# Patient Record
Sex: FEMALE | Race: OTHER | HISPANIC OR LATINO | Employment: UNEMPLOYED | URBAN - METROPOLITAN AREA
[De-identification: names, ages, dates, MRNs, and addresses within clinical notes are randomized per-mention and may not be internally consistent; named-entity substitution may affect disease eponyms.]

---

## 2019-12-25 ENCOUNTER — APPOINTMENT (EMERGENCY)
Dept: RADIOLOGY | Facility: HOSPITAL | Age: 64
End: 2019-12-25

## 2019-12-25 ENCOUNTER — HOSPITAL ENCOUNTER (EMERGENCY)
Facility: HOSPITAL | Age: 64
Discharge: HOME | End: 2019-12-25
Attending: EMERGENCY MEDICINE | Admitting: EMERGENCY MEDICINE

## 2019-12-25 VITALS
HEART RATE: 87 BPM | SYSTOLIC BLOOD PRESSURE: 161 MMHG | RESPIRATION RATE: 16 BRPM | DIASTOLIC BLOOD PRESSURE: 84 MMHG | TEMPERATURE: 98.6 F | WEIGHT: 147.71 LBS | OXYGEN SATURATION: 97 %

## 2019-12-25 DIAGNOSIS — S52.202A RADIUS/ULNA FRACTURE, LEFT, CLOSED, INITIAL ENCOUNTER: Primary | ICD-10-CM

## 2019-12-25 DIAGNOSIS — S52.92XA RADIUS/ULNA FRACTURE, LEFT, CLOSED, INITIAL ENCOUNTER: Primary | ICD-10-CM

## 2019-12-25 PROCEDURE — 73060 X-RAY EXAM OF HUMERUS: CPT

## 2019-12-25 PROCEDURE — 99284 EMERGENCY DEPT VISIT MOD MDM: CPT | Performed by: EMERGENCY MEDICINE

## 2019-12-25 PROCEDURE — 72220 X-RAY EXAM SACRUM TAILBONE: CPT

## 2019-12-25 PROCEDURE — 73080 X-RAY EXAM OF ELBOW: CPT

## 2019-12-25 PROCEDURE — 99283 EMERGENCY DEPT VISIT LOW MDM: CPT

## 2019-12-25 RX ORDER — OXYCODONE HYDROCHLORIDE AND ACETAMINOPHEN 5; 325 MG/1; MG/1
1 TABLET ORAL ONCE
Status: COMPLETED | OUTPATIENT
Start: 2019-12-25 | End: 2019-12-25

## 2019-12-25 RX ADMIN — OXYCODONE HYDROCHLORIDE AND ACETAMINOPHEN 1 TABLET: 5; 325 TABLET ORAL at 19:50

## 2019-12-26 NOTE — ED PROVIDER NOTES
History  Chief Complaint   Patient presents with    Fall     on ice    Elbow Injury     left     This is a 81-year-old female with chief complaint fall and elbow pain  History of pain using translation line  Patient is visiting from the country of [facility]  She slipped on a patch of ice today and fell onto her left elbow  She had immediate severe pain in that area  She reports 10/10 when trying move, 2/10 when at rest been supported by sling  Patient denies any numbness, paresthesias or weakness  Patient has never had an injury to this area before  Patient denies change in color  She has not taken anything for it  Worse with movement  She describes it as an ache  Patient also reports mild 2 to 3/10 pain in her coccyx  She states she has never had an injury to this area before  Says that it is worse with walking or sitting directly on the area  She is not having any weakness, numbness, change in bowel or bladder habits  She has successfully voided without difficulty  None       History reviewed  No pertinent past medical history  History reviewed  No pertinent surgical history  History reviewed  No pertinent family history  I have reviewed and agree with the history as documented  Social History     Tobacco Use    Smoking status: Never Smoker    Smokeless tobacco: Never Used   Substance Use Topics    Alcohol use: Not Currently    Drug use: Never        Review of Systems   Constitutional: Negative for activity change, chills, fatigue and fever  HENT: Negative for congestion  Eyes: Negative for visual disturbance  Respiratory: Negative for cough, chest tightness and shortness of breath  Cardiovascular: Negative for chest pain  Gastrointestinal: Negative for abdominal pain, diarrhea and vomiting  Genitourinary: Negative for dysuria  Skin: Negative for rash  Neurological: Negative for dizziness, weakness and numbness         Physical Exam  Physical Exam Constitutional: She is oriented to person, place, and time  She appears well-developed and well-nourished  HENT:   Head: Normocephalic and atraumatic  Eyes: Pupils are equal, round, and reactive to light  Conjunctivae and EOM are normal    Neck: Normal range of motion  Neck supple  Cardiovascular: Normal rate, regular rhythm and normal heart sounds  Pulmonary/Chest: Effort normal and breath sounds normal  No respiratory distress  Abdominal: Soft  Bowel sounds are normal    Musculoskeletal: Normal range of motion  Pain to palpation over the elbow joint itself, as well as proximal ulna and distal humerus  Full range of motion and strength in the wrist and fingers  Minimal tenderness to palpation over the coccyx  No other tenderness to palpation over the spine  Neck is supple full range of motion  Neurological: She is alert and oriented to person, place, and time  Neurovascularly intact left upper extremity  Plus two radial pulse, good cap refill and full sensation to touch  Skin: Skin is warm and dry  Capillary refill takes less than 2 seconds  Psychiatric: She has a normal mood and affect   Her behavior is normal        Vital Signs  ED Triage Vitals [12/25/19 1803]   Temperature Pulse Respirations Blood Pressure SpO2   98 6 °F (37 °C) 87 16 161/84 97 %      Temp Source Heart Rate Source Patient Position - Orthostatic VS BP Location FiO2 (%)   Temporal Monitor Sitting Left arm --      Pain Score       No Pain           Vitals:    12/25/19 1803   BP: 161/84   Pulse: 87   Patient Position - Orthostatic VS: Sitting         Visual Acuity      ED Medications  Medications   oxyCODONE-acetaminophen (PERCOCET) 5-325 mg per tablet 1 tablet (1 tablet Oral Given 12/25/19 1950)       Diagnostic Studies  Results Reviewed     None                 XR elbow 3+ vw LEFT   ED Interpretation by May Barbosa MD (12/25 9610)   Fx at articulating aspect of ulna      Final Result by Chayo Marie MD (12/25 1941)      Fracture of the coronoid process of the left ulna  Workstation performed: [id number]         XR sacrum and coccyx   ED Interpretation by Nixon Garcia MD (12/25 1920)   NAF      Final Result by Becka Haynes MD (12/25 1940)      No fracture  Workstation performed: [id number]         XR humerus LEFT   ED Interpretation by Nixon Garcia MD (12/25 1914)   NAF      Final Result by Charles Childress MD (12/25 1944)      No acute bony abnormality in the left humerus  Workstation performed: [id number]                    Procedures  Procedures         ED Course                               MDM  Number of Diagnoses or Management Options  Radius/ulna fracture, left, closed, initial encounter: new and requires workup  Diagnosis management comments: This is a 28-year-old female with a fracture of the left ulna  Discussed the case with Dr Kosta Cardozo of Orthopedics who also saw the images and felt that the patient could be handled in outpatient setting  Patient given a splint, sling, a single dose of oxycodone, and instructions to follow up by calling his office 1st thing in the morning  Reiterated this multiple times  Patient is neurovascularly intact  Discussed warning signs and symptoms with the patient as well as when to return to the emergency department versus follow up with Orthopedics  Patient states understanding and agreement with the plan  Amount and/or Complexity of Data Reviewed  Tests in the radiology section of CPT®: reviewed and ordered  Discuss the patient with other providers: yes  Independent visualization of images, tracings, or specimens: yes          Disposition  Final diagnoses:   Radius/ulna fracture, left, closed, initial encounter     Time reflects when diagnosis was documented in both MDM as applicable and the Disposition within this note     Time User Action Codes Description Comment    12/25/2019  7:17 PM Shaunna Felipe Add [[id number]  [address] [place] [address],  [address]] Radius/ulna fracture, left, closed, initial encounter       ED Disposition     ED Disposition Condition Date/Time Comment    Discharge Stable Wed Dec 25, 2019  7:17 PM Ayala Pluck discharge to home/self care  Follow-up Information     Follow up With Specialties Details Why Contact Info    Hilton Seth, DO Orthopedic Surgery Call in 1 day  [address]  [phone] [facility] [phone]  [address] [facility] [address]            There are no discharge medications for this patient      Outpatient Discharge Orders   [facility]       ED Provider  Electronically Signed by           Lauro Sharpe MD  12/25/19 5751

## 2019-12-26 NOTE — ED NOTES
Ice bag given   Denied questions or needs per fluent english speaking visitor     Jessica Esquivel, RN  12/25/19 1949